# Patient Record
Sex: MALE | Race: OTHER | HISPANIC OR LATINO | URBAN - METROPOLITAN AREA
[De-identification: names, ages, dates, MRNs, and addresses within clinical notes are randomized per-mention and may not be internally consistent; named-entity substitution may affect disease eponyms.]

---

## 2021-08-26 ENCOUNTER — OUTPATIENT (OUTPATIENT)
Dept: OUTPATIENT SERVICES | Facility: HOSPITAL | Age: 31
LOS: 1 days | End: 2021-08-26
Payer: COMMERCIAL

## 2021-08-26 DIAGNOSIS — Z11.52 ENCOUNTER FOR SCREENING FOR COVID-19: ICD-10-CM

## 2021-08-26 LAB — SARS-COV-2 RNA SPEC QL NAA+PROBE: SIGNIFICANT CHANGE UP

## 2021-08-26 PROCEDURE — 87635 SARS-COV-2 COVID-19 AMP PRB: CPT

## 2022-07-14 ENCOUNTER — EMERGENCY (EMERGENCY)
Facility: HOSPITAL | Age: 32
LOS: 1 days | Discharge: HOME | End: 2022-07-14
Attending: EMERGENCY MEDICINE
Payer: COMMERCIAL

## 2022-07-14 VITALS
RESPIRATION RATE: 18 BRPM | HEART RATE: 74 BPM | SYSTOLIC BLOOD PRESSURE: 124 MMHG | WEIGHT: 209.44 LBS | DIASTOLIC BLOOD PRESSURE: 70 MMHG | OXYGEN SATURATION: 98 % | TEMPERATURE: 98 F

## 2022-07-14 PROCEDURE — 99282 EMERGENCY DEPT VISIT SF MDM: CPT

## 2022-07-14 NOTE — ED PROVIDER NOTE - OBJECTIVE STATEMENT
31 year old male no significant past history presents for seroma of left lateral lower leg.  Patient states that 1.5 weeks ago he got hit with a piece of concrete at work.  States he has been working since the injury but noted that the area went from being hard to being soft.  Denies any current pain to area, denies any fevers, chills.

## 2022-07-14 NOTE — ED PROVIDER NOTE - PHYSICAL EXAMINATION
GEN:   comfortable, in no apparent distress, AOx3  EYES:   PERRL, extra-occular movements intact  HEENT:   airway patent, moist mucosal membranes, uvula midline  CV:  RRR, Pulses- Radial: 2+ bilateral and equal  RESP:  non-labored, speaking in full sentences  ABD:   soft, non tender, no guarding  :   no cva tenderness  MSK:   left lateral lower leg 5x4cm area seroma.  No warmth, streaking, redness.  no motor or sensory deficits. no radha tenderness.  rest of msk: no musculoskeletal tenderness, 5/5 strength, moving all extremities  SKIN:   dry, intact, no rash  NEURO:   AOx3, no focal weakness or loss of sensation, gait normal, GCS 15  PSYCH: calm, cooperative, no apparent risk to self and others

## 2022-07-14 NOTE — ED PROVIDER NOTE - NSFOLLOWUPINSTRUCTIONS_ED_ALL_ED_FT
Seroma    AMBULATORY CARE:    A seroma is a pocket of clear fluid that develops after surgery or an injury. The fluid can collect in tissues or under the skin.    Common signs and symptoms of a seroma:   •A swollen lump that may be tender or sore when you touch it      •Clear fluid coming from the incision site      •A hard knot in the incision site if the seroma hardens      Seek care immediately if:   •You see pus, watery blood, or fluid coming from your surgery site.      •Your surgery wound comes open.      •You have a high fever.      •You have severe pain, redness, warmth, or swelling in the surgery site.      •Your heart is beating faster than usual.      •You have a lump near the surgery site, or the area is tender.      Contact your healthcare provider if:   •You have a mild fever that does not come down with medicine.      •You have questions or concerns about your condition or care.

## 2022-07-14 NOTE — ED PROVIDER NOTE - ATTENDING APP SHARED VISIT CONTRIBUTION OF CARE
30 yo M with recent trauma to L leg   No with area of fluctuance    No overlying skin changes  No ttp  L lateral lower leg fluctuance    Suspect seroma from previous hematoma  Not infected   Dc supportive care

## 2022-07-14 NOTE — ED PROVIDER NOTE - NS ED ATTENDING STATEMENT MOD
This was a shared visit with the ALEXANDRA. I reviewed and verified the documentation and independently performed the documented:

## 2022-07-14 NOTE — ED PROVIDER NOTE - PATIENT PORTAL LINK FT
You can access the FollowMyHealth Patient Portal offered by [facility] by registering at the following website: [email or web address]. By joining [employer]’s FollowMyHealth portal, you will also be able to view your health information using other applications (apps) compatible with our system.

## 2022-07-14 NOTE — ED PROVIDER NOTE - CLINICAL SUMMARY MEDICAL DECISION MAKING FREE TEXT BOX
Patient presents with small seroma at site of previous injury.  No signs of infection.  will provide ACE wrap, pcp f/u, return precautions.

## 2022-12-21 ENCOUNTER — EMERGENCY (EMERGENCY)
Facility: HOSPITAL | Age: 32
LOS: 1 days | Discharge: HOME | End: 2022-12-21
Attending: EMERGENCY MEDICINE
Payer: COMMERCIAL

## 2022-12-21 VITALS
DIASTOLIC BLOOD PRESSURE: 76 MMHG | RESPIRATION RATE: 18 BRPM | OXYGEN SATURATION: 100 % | TEMPERATURE: 98 F | HEART RATE: 60 BPM | SYSTOLIC BLOOD PRESSURE: 119 MMHG

## 2022-12-21 VITALS
OXYGEN SATURATION: 99 % | WEIGHT: 192.02 LBS | DIASTOLIC BLOOD PRESSURE: 60 MMHG | HEART RATE: 63 BPM | SYSTOLIC BLOOD PRESSURE: 101 MMHG | TEMPERATURE: 98 F | RESPIRATION RATE: 18 BRPM | HEIGHT: 70 IN

## 2022-12-21 LAB
ALBUMIN SERPL ELPH-MCNC: 3.5 G/DL — SIGNIFICANT CHANGE UP (ref 3.5–5)
ALP SERPL-CCNC: 77 U/L — SIGNIFICANT CHANGE UP (ref 40–120)
ALT FLD-CCNC: 27 U/L DA — SIGNIFICANT CHANGE UP (ref 10–60)
ANION GAP SERPL CALC-SCNC: 7 MMOL/L — SIGNIFICANT CHANGE UP (ref 5–17)
AST SERPL-CCNC: 22 U/L — SIGNIFICANT CHANGE UP (ref 10–40)
BASOPHILS # BLD AUTO: 0.05 K/UL — SIGNIFICANT CHANGE UP (ref 0–0.2)
BASOPHILS NFR BLD AUTO: 1.1 % — SIGNIFICANT CHANGE UP (ref 0–2)
BILIRUB SERPL-MCNC: 0.5 MG/DL — SIGNIFICANT CHANGE UP (ref 0.2–1.2)
BUN SERPL-MCNC: 24 MG/DL — HIGH (ref 7–18)
CALCIUM SERPL-MCNC: 8.6 MG/DL — SIGNIFICANT CHANGE UP (ref 8.4–10.5)
CHLORIDE SERPL-SCNC: 105 MMOL/L — SIGNIFICANT CHANGE UP (ref 96–108)
CO2 SERPL-SCNC: 27 MMOL/L — SIGNIFICANT CHANGE UP (ref 22–31)
CREAT SERPL-MCNC: 1.05 MG/DL — SIGNIFICANT CHANGE UP (ref 0.5–1.3)
D DIMER BLD IA.RAPID-MCNC: <150 NG/ML DDU — SIGNIFICANT CHANGE UP
EGFR: 97 ML/MIN/1.73M2 — SIGNIFICANT CHANGE UP
EOSINOPHIL # BLD AUTO: 0.12 K/UL — SIGNIFICANT CHANGE UP (ref 0–0.5)
EOSINOPHIL NFR BLD AUTO: 2.6 % — SIGNIFICANT CHANGE UP (ref 0–6)
GLUCOSE SERPL-MCNC: 98 MG/DL — SIGNIFICANT CHANGE UP (ref 70–99)
HCT VFR BLD CALC: 40 % — SIGNIFICANT CHANGE UP (ref 39–50)
HGB BLD-MCNC: 13 G/DL — SIGNIFICANT CHANGE UP (ref 13–17)
IMM GRANULOCYTES NFR BLD AUTO: 0.2 % — SIGNIFICANT CHANGE UP (ref 0–0.9)
LYMPHOCYTES # BLD AUTO: 1.63 K/UL — SIGNIFICANT CHANGE UP (ref 1–3.3)
LYMPHOCYTES # BLD AUTO: 36 % — SIGNIFICANT CHANGE UP (ref 13–44)
MCHC RBC-ENTMCNC: 28.4 PG — SIGNIFICANT CHANGE UP (ref 27–34)
MCHC RBC-ENTMCNC: 32.5 GM/DL — SIGNIFICANT CHANGE UP (ref 32–36)
MCV RBC AUTO: 87.3 FL — SIGNIFICANT CHANGE UP (ref 80–100)
MONOCYTES # BLD AUTO: 0.61 K/UL — SIGNIFICANT CHANGE UP (ref 0–0.9)
MONOCYTES NFR BLD AUTO: 13.5 % — SIGNIFICANT CHANGE UP (ref 2–14)
NEUTROPHILS # BLD AUTO: 2.11 K/UL — SIGNIFICANT CHANGE UP (ref 1.8–7.4)
NEUTROPHILS NFR BLD AUTO: 46.6 % — SIGNIFICANT CHANGE UP (ref 43–77)
NRBC # BLD: 0 /100 WBCS — SIGNIFICANT CHANGE UP (ref 0–0)
PLATELET # BLD AUTO: 267 K/UL — SIGNIFICANT CHANGE UP (ref 150–400)
POTASSIUM SERPL-MCNC: 3.9 MMOL/L — SIGNIFICANT CHANGE UP (ref 3.5–5.3)
POTASSIUM SERPL-SCNC: 3.9 MMOL/L — SIGNIFICANT CHANGE UP (ref 3.5–5.3)
PROT SERPL-MCNC: 6.6 G/DL — SIGNIFICANT CHANGE UP (ref 6–8.3)
RBC # BLD: 4.58 M/UL — SIGNIFICANT CHANGE UP (ref 4.2–5.8)
RBC # FLD: 12.7 % — SIGNIFICANT CHANGE UP (ref 10.3–14.5)
SARS-COV-2 RNA SPEC QL NAA+PROBE: SIGNIFICANT CHANGE UP
SODIUM SERPL-SCNC: 139 MMOL/L — SIGNIFICANT CHANGE UP (ref 135–145)
TROPONIN I, HIGH SENSITIVITY RESULT: 10.3 NG/L — SIGNIFICANT CHANGE UP
WBC # BLD: 4.53 K/UL — SIGNIFICANT CHANGE UP (ref 3.8–10.5)
WBC # FLD AUTO: 4.53 K/UL — SIGNIFICANT CHANGE UP (ref 3.8–10.5)

## 2022-12-21 PROCEDURE — 71045 X-RAY EXAM CHEST 1 VIEW: CPT | Mod: 26

## 2022-12-21 PROCEDURE — 36415 COLL VENOUS BLD VENIPUNCTURE: CPT

## 2022-12-21 PROCEDURE — 99285 EMERGENCY DEPT VISIT HI MDM: CPT | Mod: 25

## 2022-12-21 PROCEDURE — 84484 ASSAY OF TROPONIN QUANT: CPT

## 2022-12-21 PROCEDURE — 80053 COMPREHEN METABOLIC PANEL: CPT

## 2022-12-21 PROCEDURE — 71045 X-RAY EXAM CHEST 1 VIEW: CPT

## 2022-12-21 PROCEDURE — 93005 ELECTROCARDIOGRAM TRACING: CPT

## 2022-12-21 PROCEDURE — 87635 SARS-COV-2 COVID-19 AMP PRB: CPT

## 2022-12-21 PROCEDURE — 99285 EMERGENCY DEPT VISIT HI MDM: CPT

## 2022-12-21 PROCEDURE — 85025 COMPLETE CBC W/AUTO DIFF WBC: CPT

## 2022-12-21 PROCEDURE — 85379 FIBRIN DEGRADATION QUANT: CPT

## 2022-12-21 NOTE — ED PROVIDER NOTE - OBJECTIVE STATEMENT
31-year-old male with chief complaint of intermittent chest discomfort for 1 months.  Patient denies chest pain on my evaluation.  Patient is asymptomatic denies chest pain, no shortness of breath, no abdominal pain, no nausea, no vomiting, no fever, no coughing.  Patient denies trauma to his chest.  Patient states he came to ER because his wife is also a patient and he wanted to be checked out.  No Fhx of early heart disease.

## 2022-12-21 NOTE — ED PROVIDER NOTE - PATIENT PORTAL LINK FT
You can access the FollowMyHealth Patient Portal offered by [facility] by registering at the following website: [email or web address]. By joining [employer]’s FollowMyHealth portal, you will also be able to view your health information using other applications (apps) compatible with our system.

## 2022-12-21 NOTE — ED PROVIDER NOTE - CLINICAL SUMMARY MEDICAL DECISION MAKING FREE TEXT BOX
7:20 AM patient is in no distress and asymptomatic.  High-sensitivity troponin and D-dimers are negative, EKG is normal sinus rhythm chest x-ray is unremarkable.  Patient has no cardiac risk factors.  Patient will follow-up with his PMD.  Pt is well appearing, has no new complaints and able to walk with normal gait. Pt is stable for discharge and follow up with medical doctor. Pt educated on care and need for follow up. Discussed anticipatory guidance and return precautions. Questions answered. I had a detailed discussion with the patient and/or guardian regarding the historical points, exam findings, and any diagnostic results supporting the discharge diagnosis.

## 2022-12-21 NOTE — ED PROVIDER NOTE - CONSTITUTIONAL, MLM
normal... 03-Mar-2022 05:26 Well appearing, awake, alert, oriented to person, place, time/situation and in no apparent distress.

## 2022-12-21 NOTE — ED ADULT NURSE NOTE - NSFALLRSKINDICATORS_ED_ALL_ED
Advised wife per Dr. Gilda Conway to resume taking Metformin & he is going to increase Amaryl dose. These will be sent to pharmacy. We will have him follow up in 2 weeks to check kidney function.  Appt confirmed for Monday, May 15, 2017 11:30 AM
Nathaly Mcintyre states that the pt's blood sugar readings were; 4/26/, 4/27/, 4/28/. Please lissette Rick to advise.
Patient's caregiver, Jatin, states she's returning your call. Please call.  Thank you
Unable to reach [place].  has not been set up; unable to [place].
no

## 2022-12-21 NOTE — ED ADULT NURSE NOTE - OBJECTIVE STATEMENT
pt presents to ED with c/o chest tightness that has been on and off for days. pt states that he feels the tightness more when he is undergoing exercise or working. denies N/V/ palpitation, or SOB.

## 2022-12-21 NOTE — ED PROVIDER NOTE - NS ED MD DISPO DISCHARGE CCDA
Pt highly combative and uncooperative today, per chart review, son states she becomes extremely agitated at night on home xanax  - will continue 0.25 xanax prn Highly combative and uncooperative today, per chart review, son states she becomes extremely agitated at night on xanax.   - Increase xanax to 0.5mg prn Patient/Caregiver provided printed discharge information.

## 2022-12-21 NOTE — ED PROVIDER NOTE - EKG #1 DATE/TIME
21-Dec-2022 03:44
Detail Level: Zone
Plan: Discussed and patient given copy of skin cancers and precancers handout and a list of recommended sunscreens.

## 2022-12-21 NOTE — ED PROVIDER NOTE - NSFOLLOWUPINSTRUCTIONS_ED_ALL_ED_FT
Return to ER immediately if your chest pain returns, if you have pain with radiation to arms, back or neck, if you feel short of breath, feel weak, feel fast or pounding heart beating, if you have any fever or vomiting.  If you need assistance with follow up appointments our Care Coordinator can be reached at [phone]. In addition the [facility] is located at [address] [place], [place] [address], tel: [phone].

## 2022-12-21 NOTE — ED PROVIDER NOTE - NSFOLLOWUPCLINICS_GEN_ALL_ED_FT
[facility]  Internal Medicine  [address] [place] [address]  Phone: [phone]  Fax: [phone]

## 2023-09-29 ENCOUNTER — EMERGENCY (EMERGENCY)
Facility: HOSPITAL | Age: 33
LOS: 1 days | Discharge: HOME | End: 2023-09-29
Attending: EMERGENCY MEDICINE
Payer: COMMERCIAL

## 2023-09-29 VITALS
HEIGHT: 70 IN | SYSTOLIC BLOOD PRESSURE: 118 MMHG | HEART RATE: 65 BPM | DIASTOLIC BLOOD PRESSURE: 71 MMHG | OXYGEN SATURATION: 99 % | RESPIRATION RATE: 17 BRPM | TEMPERATURE: 98 F | WEIGHT: 219.14 LBS

## 2023-09-29 VITALS
OXYGEN SATURATION: 100 % | DIASTOLIC BLOOD PRESSURE: 74 MMHG | RESPIRATION RATE: 18 BRPM | SYSTOLIC BLOOD PRESSURE: 124 MMHG | HEART RATE: 68 BPM | TEMPERATURE: 98 F

## 2023-09-29 PROCEDURE — 70486 CT MAXILLOFACIAL W/O DYE: CPT | Mod: ME

## 2023-09-29 PROCEDURE — G1004: CPT

## 2023-09-29 PROCEDURE — 99284 EMERGENCY DEPT VISIT MOD MDM: CPT | Mod: 25

## 2023-09-29 PROCEDURE — 96372 THER/PROPH/DIAG INJ SC/IM: CPT

## 2023-09-29 PROCEDURE — 99284 EMERGENCY DEPT VISIT MOD MDM: CPT

## 2023-09-29 PROCEDURE — 70486 CT MAXILLOFACIAL W/O DYE: CPT | Mod: 26,ME

## 2023-09-29 RX ORDER — KETOROLAC TROMETHAMINE 30 MG/ML
15 SYRINGE (ML) INJECTION ONCE
Refills: 0 | Status: DISCONTINUED | OUTPATIENT
Start: 2023-09-29 | End: 2023-09-29

## 2023-09-29 RX ADMIN — Medication 15 MILLIGRAM(S): at 18:41

## 2023-09-29 RX ADMIN — Medication 15 MILLIGRAM(S): at 19:11

## 2023-09-29 NOTE — ED PROVIDER NOTE - PATIENT PORTAL LINK FT
You can access the FollowMyHealth Patient Portal offered by [facility] by registering at the following website: [email or web address]. By joining [employer]’s FollowMyHealth portal, you will also be able to view your health information using other applications (apps) compatible with our system.

## 2023-09-29 NOTE — ED PROVIDER NOTE - NS ED ROS FT
Constitutional: no fever no chills  Eyes: no conjunctivitis  Ears: no ear pain no tinnitus  Nose: no nasal congestion, Mouth/Throat: no throat pain, Neck: no stiffness  Cardiovascular: no chest pain  Respiratory: no shortness of breath no cough  Gastrointestinal: no abdominal pain, no vomiting no diarrhea  MSK: no joint pain  : no  dysuria  Skin: no rash  Neuro: no LOC no seizures

## 2023-09-29 NOTE — ED PROVIDER NOTE - NSFOLLOWUPINSTRUCTIONS_ED_ALL_ED_FT
Please follow up with your primary care physician within 2-3 days.   Return to the ER for any new or concerning symptoms.   You may take 650 mg acetaminophen every eight hours as needed for pain.   Drink plenty of fluids and rest.     You had jaw pain. We performed a CT scan which showed normal bones without any fractures. You can take tylenol and ibuprofen for the pain.    If you have new or worsening pain please return to the ER.

## 2023-09-29 NOTE — ED PROVIDER NOTE - OBJECTIVE STATEMENT
32-year-old male with no significant past medical history presents for pain on his left jaw.  Patient says he tripped and fell approximately 5 days ago when he was walking because it was dark.  Was unable to break his fall with his hands.  Landed on his face.  Has not sought medical care since the fall.  Pain is worse when he is chewing and eating.  No change in vision or hearing.  No bleeding or bruising.  Notes that there is some swelling on left side of his face.  No other complaints.  No dizziness or weakness.  No incontinence of bowel or bladder.

## 2023-09-29 NOTE — ED PROVIDER NOTE - CLINICAL SUMMARY MEDICAL DECISION MAKING FREE TEXT BOX
ATTG: : Assessment/plan: Fall with facial trauma concerns include but not limited fracture dislocation we will check CT max facial, pain medication reeval for dispo

## 2023-09-29 NOTE — ED PROVIDER NOTE - PHYSICAL EXAMINATION
Primary Survey   A - airway intact  B - bilateral breath sounds and good chest rise  C -palpable pulses in all extremities  D - GCS Motor:  6/6, Verbal  5/5, eyes 4/4 total =15   Exposure obtained      Secondary Survey:  Gen:  No respiratory Distress  /no distress from pain  HEENT: pupils 3 mm reactive to light equally,   EOMI  NO Raccoon Eyes/ Richter Sign/ Neck: C- spine non tender. no step off or deformity. tm clear.  left side face min ttp over left max / angle jaw. no gross deformity.  Lungs: breath sounds: b/l bs, no crackles no wheezing  CVS: S1S2,    Distal Pulses: 2+ radial and dp b/l  Abd: soft non tender no distention  Extremities: no edema or erythema. no tenderness.   MSK: strength: 5/5 b/l upper and lower ext, moving all ext spontaneously  Back: no midline tend or step off  Neuro: aaox3 no foal deficits.

## 2025-04-23 NOTE — ED PROVIDER NOTE - NSDCPRINTRESULTS_ED_ALL_ED
Regarding patient referral for sleep. Sleep study ordered, pending results. Thank you for the referral.   Patient requests all Lab, Cardiology, and Radiology Results on their Discharge Instructions